# Patient Record
Sex: FEMALE | Race: WHITE | NOT HISPANIC OR LATINO | URBAN - METROPOLITAN AREA
[De-identification: names, ages, dates, MRNs, and addresses within clinical notes are randomized per-mention and may not be internally consistent; named-entity substitution may affect disease eponyms.]

---

## 2017-11-10 ENCOUNTER — OFFICE VISIT - HEALTHEAST (OUTPATIENT)
Dept: FAMILY MEDICINE | Facility: CLINIC | Age: 52
End: 2017-11-10

## 2017-11-10 DIAGNOSIS — E66.9 OBESITY: ICD-10-CM

## 2017-11-10 DIAGNOSIS — Z00.00 HEALTHCARE MAINTENANCE: ICD-10-CM

## 2017-11-10 DIAGNOSIS — Z12.11 SCREEN FOR COLON CANCER: ICD-10-CM

## 2017-11-10 DIAGNOSIS — J45.20 ASTHMA, MILD INTERMITTENT, WELL-CONTROLLED: ICD-10-CM

## 2017-11-10 DIAGNOSIS — Z12.31 VISIT FOR SCREENING MAMMOGRAM: ICD-10-CM

## 2017-11-10 DIAGNOSIS — I10 ESSENTIAL HYPERTENSION, MALIGNANT: ICD-10-CM

## 2017-11-10 DIAGNOSIS — I10 HTN (HYPERTENSION): ICD-10-CM

## 2017-11-10 DIAGNOSIS — I10 ESSENTIAL HYPERTENSION: ICD-10-CM

## 2017-11-10 DIAGNOSIS — Z23 NEED FOR VACCINATION: ICD-10-CM

## 2017-11-10 LAB
CHOLEST SERPL-MCNC: 207 MG/DL
FASTING STATUS PATIENT QL REPORTED: YES
HDLC SERPL-MCNC: 40 MG/DL
LDLC SERPL CALC-MCNC: 137 MG/DL
TRIGL SERPL-MCNC: 151 MG/DL

## 2017-11-10 RX ORDER — ALBUTEROL SULFATE 90 UG/1
2 AEROSOL, METERED RESPIRATORY (INHALATION) EVERY 4 HOURS PRN
Qty: 1 INHALER | Refills: 1 | Status: SHIPPED | OUTPATIENT
Start: 2017-11-10

## 2017-11-10 RX ORDER — CHLORTHALIDONE 25 MG/1
TABLET ORAL
Qty: 90 TABLET | Refills: 3 | Status: SHIPPED | OUTPATIENT
Start: 2017-11-10

## 2017-11-10 ASSESSMENT — MIFFLIN-ST. JEOR: SCORE: 1916.31

## 2017-11-27 ENCOUNTER — AMBULATORY - HEALTHEAST (OUTPATIENT)
Dept: FAMILY MEDICINE | Facility: CLINIC | Age: 52
End: 2017-11-27

## 2017-11-27 ENCOUNTER — AMBULATORY - HEALTHEAST (OUTPATIENT)
Dept: LAB | Facility: CLINIC | Age: 52
End: 2017-11-27

## 2017-11-27 ENCOUNTER — AMBULATORY - HEALTHEAST (OUTPATIENT)
Dept: NURSING | Facility: CLINIC | Age: 52
End: 2017-11-27

## 2017-11-27 DIAGNOSIS — I10 HTN (HYPERTENSION): ICD-10-CM

## 2017-11-28 ENCOUNTER — COMMUNICATION - HEALTHEAST (OUTPATIENT)
Dept: FAMILY MEDICINE | Facility: CLINIC | Age: 52
End: 2017-11-28

## 2017-12-06 ENCOUNTER — HOSPITAL ENCOUNTER (OUTPATIENT)
Dept: MAMMOGRAPHY | Facility: HOSPITAL | Age: 52
Discharge: HOME | End: 2017-12-06
Attending: FAMILY MEDICINE

## 2017-12-06 DIAGNOSIS — Z12.31 VISIT FOR SCREENING MAMMOGRAM: ICD-10-CM

## 2017-12-13 ENCOUNTER — AMBULATORY - HEALTHEAST (OUTPATIENT)
Dept: FAMILY MEDICINE | Facility: CLINIC | Age: 52
End: 2017-12-13

## 2017-12-13 ENCOUNTER — AMBULATORY - HEALTHEAST (OUTPATIENT)
Dept: LAB | Facility: CLINIC | Age: 52
End: 2017-12-13

## 2017-12-13 ENCOUNTER — AMBULATORY - HEALTHEAST (OUTPATIENT)
Dept: NURSING | Facility: CLINIC | Age: 52
End: 2017-12-13

## 2017-12-13 DIAGNOSIS — I10 HTN (HYPERTENSION): ICD-10-CM

## 2017-12-13 DIAGNOSIS — I10 ESSENTIAL HYPERTENSION: ICD-10-CM

## 2018-01-04 ENCOUNTER — AMBULATORY - HEALTHEAST (OUTPATIENT)
Dept: LAB | Facility: CLINIC | Age: 53
End: 2018-01-04

## 2018-01-04 ENCOUNTER — OFFICE VISIT - HEALTHEAST (OUTPATIENT)
Dept: FAMILY MEDICINE | Facility: CLINIC | Age: 53
End: 2018-01-04

## 2018-01-04 DIAGNOSIS — Z13.21 SCREENING FOR ENDOCRINE, NUTRITIONAL, METABOLIC AND IMMUNITY DISORDER: ICD-10-CM

## 2018-01-04 DIAGNOSIS — Z71.9 HEALTH EDUCATION: ICD-10-CM

## 2018-01-04 DIAGNOSIS — Z13.29 SCREENING FOR ENDOCRINE, NUTRITIONAL, METABOLIC AND IMMUNITY DISORDER: ICD-10-CM

## 2018-01-04 DIAGNOSIS — Z13.0 SCREENING FOR ENDOCRINE, NUTRITIONAL, METABOLIC AND IMMUNITY DISORDER: ICD-10-CM

## 2018-01-04 DIAGNOSIS — R63.5 WEIGHT GAIN: ICD-10-CM

## 2018-01-04 DIAGNOSIS — E55.9 AVITAMINOSIS D: ICD-10-CM

## 2018-01-04 DIAGNOSIS — Z13.0 SCREENING FOR DEFICIENCY ANEMIA: ICD-10-CM

## 2018-01-04 DIAGNOSIS — L83 ACANTHOSIS NIGRICANS: ICD-10-CM

## 2018-01-04 DIAGNOSIS — Z13.228 SCREENING FOR ENDOCRINE, NUTRITIONAL, METABOLIC AND IMMUNITY DISORDER: ICD-10-CM

## 2018-01-04 DIAGNOSIS — E88.810 METABOLIC SYNDROME: ICD-10-CM

## 2018-01-04 DIAGNOSIS — R73.03 PREDIABETES: ICD-10-CM

## 2018-01-04 DIAGNOSIS — E28.2 PCOS (POLYCYSTIC OVARIAN SYNDROME): ICD-10-CM

## 2018-01-04 LAB
ERYTHROCYTE [DISTWIDTH] IN BLOOD BY AUTOMATED COUNT: 12 % (ref 11–14.5)
HBA1C MFR BLD: 6 % (ref 3.5–6)
HCT VFR BLD AUTO: 39.2 % (ref 35–47)
HGB BLD-MCNC: 13.1 G/DL (ref 12–16)
MCH RBC QN AUTO: 29 PG (ref 27–34)
MCHC RBC AUTO-ENTMCNC: 33.4 G/DL (ref 32–36)
MCV RBC AUTO: 87 FL (ref 80–100)
PLATELET # BLD AUTO: 460 THOU/UL (ref 140–440)
PMV BLD AUTO: 6.9 FL (ref 7–10)
RBC # BLD AUTO: 4.51 MILL/UL (ref 3.8–5.4)
TSH SERPL DL<=0.005 MIU/L-ACNC: 3.19 UIU/ML (ref 0.3–5)
WBC: 11 THOU/UL (ref 4–11)

## 2018-01-04 ASSESSMENT — MIFFLIN-ST. JEOR: SCORE: 1920.85

## 2018-01-04 NOTE — ASSESSMENT & PLAN NOTE
52 y.o. female in clinic today to discuss treatment of the following conditions through radical diet change, lifestyle modification and weight loss:  1. Class 3 obesity with serious comorbidity and body mass index (BMI) of 45.0 to 49.9 in adult, unspecified obesity type    2. Metabolic syndrome    3. Acanthosis nigricans    4. PCOS (polycystic ovarian syndrome)    5. Prediabetes    6. Screening for endocrine, nutritional, metabolic and immunity disorder    7. Screening for deficiency anemia      This patient meets 5/5 criteria for metabolic syndrome.  She is hypertension.  She has laboratory evidence today of prediabetes.  She eats sporadically in foods that are of low quality in low quantity.  Given that she is not struggling with excessive hunger, I am recommending that she increase her nutrition and specifically focus on eating breakfast every day.  For now, any type of breakfast (hard boiled egg?)  Is the recommendation.  We did talk about weekend meal planning given the intensity of her work schedule.  She will increase physical activity as able.  -Discuss whether or not the patient is snoring at our next visit.  -Start metformin today.  Thousand milligrams daily.  -We discussed expectations for her program.  I explained that we are not a project although we will help her prepare for her daughter's wedding.  We will work towards modest weight loss.  -She is a candidate for [place] surgery we did discuss that this might be indicated.  Currently, her insurance is an exclusion but she was urged to check to see if this is changed in 2018.  If her job is changing as she is working on, she may want to consider this with different/improved insurance.    The patient defines success for this program as follows:qualitative: Avoid diabetes.  Be more comfortable in settings such as the gym and in the pool.  Prepare for daughter's wedding in 4 months.   - quantitative: 175 lbs (last at this weight at age of  28)    Medications prescribed today:   - Metformin was prescribed.        Return to clinic in 4 weeks.

## 2018-01-05 LAB
25(OH)D3 SERPL-MCNC: 25.1 NG/ML (ref 30–80)
25(OH)D3 SERPL-MCNC: 25.1 NG/ML (ref 30–80)

## 2018-01-30 ENCOUNTER — COMMUNICATION - HEALTHEAST (OUTPATIENT)
Dept: FAMILY MEDICINE | Facility: CLINIC | Age: 53
End: 2018-01-30

## 2018-02-09 ENCOUNTER — OFFICE VISIT - HEALTHEAST (OUTPATIENT)
Dept: FAMILY MEDICINE | Facility: CLINIC | Age: 53
End: 2018-02-09

## 2018-02-09 DIAGNOSIS — E88.810 METABOLIC SYNDROME: ICD-10-CM

## 2018-02-09 DIAGNOSIS — I10 ESSENTIAL HYPERTENSION: ICD-10-CM

## 2018-02-09 DIAGNOSIS — R73.03 PREDIABETES: ICD-10-CM

## 2018-02-09 NOTE — ASSESSMENT & PLAN NOTE
52 y.o. year old female in clinic today to discuss treatment of the following conditions through diet and lifestyle modification and weight loss:  1. Prediabetes    2. Metabolic syndrome    3. Essential hypertension    4. Class 3 obesity with serious comorbidity and body mass index (BMI) of 45.0 to 49.9 in adult, unspecified obesity type      The patient's efforts this past month were successful as evidenced by weight loss and adherence to dietary recommendations.  I am somewhat concernedthat she is not consuming adequate calories and nutrition.  We discussed bulking up her breakfast.  I did congratulate her on eating breakfast she had not been in the practice previously.  I also encouraged her to meet with the dietitian to work on strategies to add variety to her meal plans.  She will work on adding more calories through healthy fats and a larger breakfast.  As the weather allows and her schedule allows she will beginto add exercise.  Return to clinic in 1 month.  -I suspect that during the next month she will require titration of her into hypertensive medications.  Initially chlorthalidone was started to help address lower extremity swelling.  Losartan was added for blood pressure management.  I would  consider decreasing her losartan before adjusting her chlorthalidone.

## 2018-02-23 ENCOUNTER — SURGERY - HEALTHEAST (OUTPATIENT)
Dept: GASTROENTEROLOGY | Facility: HOSPITAL | Age: 53
End: 2018-02-23

## 2018-02-23 ASSESSMENT — MIFFLIN-ST. JEOR: SCORE: 1825.59

## 2018-03-12 ENCOUNTER — OFFICE VISIT - HEALTHEAST (OUTPATIENT)
Dept: FAMILY MEDICINE | Facility: CLINIC | Age: 53
End: 2018-03-12

## 2018-03-12 DIAGNOSIS — E28.2 PCOS (POLYCYSTIC OVARIAN SYNDROME): ICD-10-CM

## 2018-03-12 DIAGNOSIS — I10 ESSENTIAL HYPERTENSION: ICD-10-CM

## 2018-03-12 DIAGNOSIS — R73.03 PREDIABETES: ICD-10-CM

## 2018-03-12 DIAGNOSIS — E88.810 METABOLIC SYNDROME: ICD-10-CM

## 2018-03-12 NOTE — ASSESSMENT & PLAN NOTE
"52 y.o. year old female in clinic today to discuss treatment of the following conditions through diet and lifestyle modification and weight loss:  1. Class 3 obesity with serious comorbidity and body mass index (BMI) of 45.0 to 49.9 in adult, unspecified obesity type    2. Essential hypertension    3. Metabolic syndrome    4. PCOS (polycystic ovarian syndrome)    5. Prediabetes      The patient's efforts this past month were unsuccessful giving return to previous diet plan.   - resume planning, prepping and eating breakfast.   - \"make it a habit.\"   - RTC in one month.  Place framework for time after she has moved to [place].    "

## 2018-04-15 ENCOUNTER — COMMUNICATION - HEALTHEAST (OUTPATIENT)
Dept: FAMILY MEDICINE | Facility: CLINIC | Age: 53
End: 2018-04-15

## 2018-04-25 ENCOUNTER — OFFICE VISIT - HEALTHEAST (OUTPATIENT)
Dept: FAMILY MEDICINE | Facility: CLINIC | Age: 53
End: 2018-04-25

## 2018-04-25 DIAGNOSIS — I10 ESSENTIAL HYPERTENSION: ICD-10-CM

## 2018-04-25 DIAGNOSIS — L83 ACANTHOSIS NIGRICANS: ICD-10-CM

## 2018-04-25 DIAGNOSIS — E88.810 METABOLIC SYNDROME: ICD-10-CM

## 2018-04-25 DIAGNOSIS — E28.2 PCOS (POLYCYSTIC OVARIAN SYNDROME): ICD-10-CM

## 2018-04-25 RX ORDER — METFORMIN HCL 500 MG
1000 TABLET, EXTENDED RELEASE 24 HR ORAL
Qty: 180 TABLET | Refills: 1 | Status: SHIPPED | OUTPATIENT
Start: 2018-04-25

## 2018-04-25 NOTE — ASSESSMENT & PLAN NOTE
52 y.o. year old female in clinic today to discuss treatment of the following conditions through diet and lifestyle modification and weight loss:  1. Essential hypertension    2. Class 3 obesity with serious comorbidity and body mass index (BMI) of 45.0 to 49.9 in adult, unspecified obesity type    3. Metabolic syndrome    4. Acanthosis nigricans    5. PCOS (polycystic ovarian syndrome)      The patient's efforts this past month were successful as evidenced by weight loss.   - continue to plan and prep.   - continue metformin   - establish care in [place].

## 2018-05-03 ENCOUNTER — COMMUNICATION - HEALTHEAST (OUTPATIENT)
Dept: FAMILY MEDICINE | Facility: CLINIC | Age: 53
End: 2018-05-03

## 2018-07-02 ENCOUNTER — COMMUNICATION - HEALTHEAST (OUTPATIENT)
Dept: FAMILY MEDICINE | Facility: CLINIC | Age: 53
End: 2018-07-02

## 2018-07-02 DIAGNOSIS — L83 ACANTHOSIS NIGRICANS: ICD-10-CM

## 2018-07-02 DIAGNOSIS — E88.810 METABOLIC SYNDROME: ICD-10-CM

## 2018-07-02 DIAGNOSIS — E28.2 PCOS (POLYCYSTIC OVARIAN SYNDROME): ICD-10-CM

## 2018-07-03 RX ORDER — METFORMIN HCL 500 MG
TABLET, EXTENDED RELEASE 24 HR ORAL
Qty: 180 TABLET | Refills: 1 | Status: SHIPPED | OUTPATIENT
Start: 2018-07-03

## 2018-11-20 ENCOUNTER — COMMUNICATION - HEALTHEAST (OUTPATIENT)
Dept: FAMILY MEDICINE | Facility: CLINIC | Age: 53
End: 2018-11-20

## 2018-11-20 DIAGNOSIS — I10 ESSENTIAL HYPERTENSION, MALIGNANT: ICD-10-CM

## 2018-12-05 ENCOUNTER — COMMUNICATION - HEALTHEAST (OUTPATIENT)
Dept: FAMILY MEDICINE | Facility: CLINIC | Age: 53
End: 2018-12-05

## 2018-12-05 DIAGNOSIS — I10 ESSENTIAL HYPERTENSION: ICD-10-CM

## 2018-12-12 ENCOUNTER — COMMUNICATION - HEALTHEAST (OUTPATIENT)
Dept: FAMILY MEDICINE | Facility: CLINIC | Age: 53
End: 2018-12-12

## 2019-03-18 ENCOUNTER — COMMUNICATION - HEALTHEAST (OUTPATIENT)
Dept: FAMILY MEDICINE | Facility: CLINIC | Age: 54
End: 2019-03-18

## 2019-03-18 DIAGNOSIS — I10 ESSENTIAL HYPERTENSION: ICD-10-CM

## 2019-03-19 RX ORDER — LOSARTAN POTASSIUM 25 MG/1
25 TABLET ORAL DAILY
Qty: 30 TABLET | Refills: 1 | Status: SHIPPED | OUTPATIENT
Start: 2019-03-19

## 2021-05-31 VITALS — BODY MASS INDEX: 48.48 KG/M2 | WEIGHT: 291 LBS | HEIGHT: 65 IN

## 2021-05-31 VITALS — WEIGHT: 290 LBS | HEIGHT: 65 IN | BODY MASS INDEX: 48.32 KG/M2

## 2021-06-01 VITALS — WEIGHT: 273 LBS | BODY MASS INDEX: 45.43 KG/M2

## 2021-06-01 VITALS — BODY MASS INDEX: 44.43 KG/M2 | WEIGHT: 267 LBS

## 2021-06-01 VITALS — HEIGHT: 65 IN | BODY MASS INDEX: 44.98 KG/M2 | WEIGHT: 270 LBS

## 2021-06-01 VITALS — BODY MASS INDEX: 45.45 KG/M2 | WEIGHT: 273.1 LBS

## 2021-06-14 NOTE — PROGRESS NOTES
Refill sent.  BP looks adequately controlled. Continue current dose. If cough was from lisinopril, would expect it to completely clear up soon. If not gone in 2 weeks, schedule OV to look for other causes.

## 2021-06-14 NOTE — PROGRESS NOTES
I met with Natasha Mcdonald at the request of DR Marina to recheck her blood pressure.  Blood pressure medications on the MAR were reviewed with patient.    Patient has taken all medications as per usual regimen: Yes  Patient reports tolerating them without any issues or concerns: No and Pt stated severly coughing since starting the Lisinopril. She would like an alternative RX if this is something that wont get better.     Vitals:    11/27/17 1334   BP: 124/80   Patient Site: Right Arm   Patient Position: Sitting   Cuff Size: Adult Large   Pulse: 72       Blood pressure was taken, previous encounter was reviewed, recorded blood pressure below 140/90.  Patient was discharged and the note will be sent to the provider for final review.

## 2021-06-14 NOTE — PROGRESS NOTES
I met with Natasha Mcdonald at the request of  Dr Marina to recheck her blood pressure.  Blood pressure medications on the MAR were reviewed with patient.    Patient has taken all medications as per usual regimen: Yes; Switched from Lisinopril to Losartan   Patient reports tolerating them without any issues or concerns: Yes;  Still has slight cough from Lisinopril.   She will need refill sent to pharmacy - RX pended         Vitals:    12/13/17 0908   BP: 128/80   Patient Site: Right Arm   Patient Position: Sitting   Cuff Size: Adult Large   Pulse: 76       Blood pressure was taken, previous encounter was reviewed, recorded blood pressure below 140/90.  Patient was discharged and the note will be sent to the provider for final review.

## 2021-06-14 NOTE — PROGRESS NOTES
New rx sent for losartan 25mg daily instead of lisinopril. Recheck nurse BP and nonfasting labs in 2 weeks.

## 2021-06-14 NOTE — PROGRESS NOTES
"ASSESSMENT & PLAN:    1. Essential hypertension  Significantly elevated blood pressure today, denies any symptoms.  She is taking 3 year old chlorthalidone, and also states her blood pressure typically runs higher in clinic due to whitecoat hypertension.  She has not been checking at home recently.  A new prescription for chlorthalidone was sent, as well as a new prescription for lisinopril 10 mg daily, which she can start tonight or tomorrow as long as labs return with normal potassium and creatinine.  Recommend she return in 2 weeks for nurse blood pressure check and nonfasting labs for BMP after starting lisinopril.  Suspect she will likely need an increase in her lisinopril, but she should monitor at home over the next couple weeks and return for nurse blood pressure check in 2 weeks.  - chlorthalidone (HYGROTEN) 25 MG tablet; TAKE 1 TABLET (25 MG TOTAL) BY MOUTH DAILY.  Dispense: 90 tablet; Refill: 3  - CMP    2.  Annual physical  Hysterectomy for adenomyosis, no longer needs Paps.  Absence of cervix confirmed on speculum exam today.  Mammogram and colonoscopy advised.  She could also look into cologuard insurance coverage.  Fasting lipids and glucose today.  Tdap and flu shot today.  - Mammo Screening Bilateral; Future  - Ambulatory referral for Colonoscopy  - Cologuard  - Tdap vaccine,  6yo or older,  IM  - Influenza, Seasonal,Quad Inj, 36+ MOS    3.  Obesity  States she attended a class for bariatric surgery, wanted to go ahead with it, but states her insurance will not cover it.  Discussed [place] [facility], and she is interested.  Referral placed.  - Comprehensive Metabolic Panel    4. Asthma, mild intermittent, well-controlled  Rare albuterol use, only with \"bronchitis\" on average once per winter.  Refill provided to have on hand when needed.  - albuterol (PROAIR HFA) 90 mcg/actuation inhaler; Inhale 2 puffs every 4 (four) hours as needed for wheezing.  Dispense: 1 Inhaler; Refill: " 1      Patient Instructions   1. Check to see if Cologuard is covered by your insurance.   2. Either schedule a colonoscopy or complete Cologuard screening.   3. Schedule a nurse blood pressure check and fasting labs in 2 weeks.   4.  and start new chlorthalidone prescription.   5. If your labs come back normal tonight (potassium and creatinine) you can start the lisinopril tomorrow.   6. Schedule with [facility] - [email or web address]        Orders Placed This Encounter   Procedures     Mammo Screening Bilateral     Standing Status:   Future     Standing Expiration Date:   2/10/2019     Order Specific Question:   Reason for Exam (Describe Symptoms):     Answer:   Screening     Order Specific Question:   Is the patient pregnant?     Answer:   No     Order Specific Question:   Can the procedure be changed per Radiologist protocol?     Answer:   Yes     Tdap vaccine,  8yo or older,  IM     Influenza, Seasonal,Quad Inj, 36+ MOS     Comprehensive Metabolic Panel     Lipid Cascade     Order Specific Question:   Fasting is required?     Answer:   Yes     Ambulatory referral for Colonoscopy     Referral Priority:   Routine     Referral Type:   Colonoscopy     Referral Reason:   Evaluation and Treatment     Requested Specialty:   Gastroenterology     Number of Visits Requested:   1     Ambulatory referral for Weight Management: [place]     Referral Priority:   Routine     Referral Type:   Health Education     Referral Reason:   Evaluation and Treatment     Number of Visits Requested:   1     Cologuard     Medications Discontinued During This Encounter   Medication Reason     oxyCODONE-acetaminophen (PERCOCET) 5-325 mg per tablet Therapy completed     chlorthalidone (HYGROTEN) 25 MG tablet Reorder     albuterol (PROAIR HFA) 90 mcg/actuation inhaler Reorder       No Follow-up on file.    CHIEF COMPLAINT:  Chief Complaint   Patient presents  with     Annual Exam     fasting labs; no concerns       HISTORY OF PRESENT ILLNESS:  Natasha is a 52 y.o. female presenting to the clinic today for a physical examination.    Hypertension: She recently restarted taking 25 mg of chlorthalidone for fluid retention in her legs. She had stopped taking it because she misplaced the prescription while moving homes. She notes that she has taken hydrochlorothiazide in the past, previous to chlorthalidone. Her blood pressure today is 170/102. She says that she does get nervous at the clinic. She states that she can feel her blood pressure is elevated because she has been experiencing a lot of tension and stress at work for the last six moths. She does have a blood pressure cuff at home but she has not been using it. She denies experiencing any shortness of breath.     Health Maintenance: She is due for a colonoscopy and will either do a colonoscopy or Cologuard screening. She is due for a mammogram.      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 About one year ago she was scheduled for bariatric surgery but her insurance refused to cover it, labeling it as a cosmetic surgery. She had already [facility]. She was very sad she could not get the surgery and is wondering about other options. She plans to schedule an appointment with [facility] in [place].      REVIEW OF SYSTEMS:   She typically experiences bronchitis once per year and treats this with albuterol. She does have a skin tag on her genital area but it does not bother her. She denies experiencing any symptoms of menopause. All other systems are negative.    PFSH:  Surgical: She had a hysterectomy when she was 43 years old due to adenomyosis. Her cervix was removed with the hysterectomy so no longer needs pap smears. She denies any cancer or precancer of the cervix. 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 She was following up at [facility] for 6 years but is doing well now. She did not need chemotherapy. They instructed her to follow up if any new lumps/growths.  Family: She denies any family history of colon or breast cancer. She denies any family history of heart attacks, heart defects, or strokes. Her mother has moderate hypertension that is well controlled on medications.     Medical: She has not had a colonoscopy before. She denies any family history of diabetes. She denies experiencing gestational diabetes during her one pregnancy.       Social History     Social History     Marital status: Single     Spouse name: N/A     Number of children: N/A     Years of education: N/A     Occupational History     Not on file.     Social History Main Topics     Smoking status: Never Smoker     Smokeless tobacco: Not on file     Alcohol use Not on file     Drug use: Not on file     Sexual activity: Not on file     Other Topics Concern     Not on file     Social History Narrative       No past medical history on file.    Past Surgical History:   Procedure Laterality Date      SECTION       HYSTERECTOMY       OTHER SURGICAL HISTORY      liposarcoma removal, left arm     TONSILLECTOMY         No Known Allergies    Active Ambulatory Problems     Diagnosis Date Noted     Liposarcoma of upper extremity 2015     HTN (hypertension) 2015     Mild intermittent asthma 2015     Resolved Ambulatory Problems     Diagnosis Date Noted     No Resolved Ambulatory Problems     No Additional Past Medical History       Family History   Problem Relation Age of Onset     Lymphoma Father      Hypertension         VITALS:  Vitals:    11/10/17 1049   BP: (!) 170/102   Patient Site: Right Arm   Patient Position: Sitting   Cuff Size: Adult Large   Pulse: 72   Resp: 16   Temp: 98.3  F (36.8  C)   TempSrc: Oral   Weight: (!) 290 lb (131.5 kg)   Height: 5' 5\" (1.651 m)     Wt Readings from Last 3 " Encounters:   11/10/17 (!) 290 lb (131.5 kg)   03/17/15 (!) 278 lb (126.1 kg)       QUALITY MEASURES:  The following high BMI interventions were performed this visit: encouragement to exercise    PHYSICAL EXAM:  GENERAL:  Pleasant, well-appearing woman in no acute distress.  VITAL SIGNS:  Reviewed.  HEENT:  Pupils are equal, round, and reactive to light.  Sclerae and  conjunctivae clear.  TMs are clear bilaterally.  Oropharynx is clear with  moist mucous membranes.  NECK:  Supple without lymphadenopathy or thyromegaly.  No carotid bruits.  CARDIOVASCULAR:  Heart regular rate and rhythm without murmur.  Normal S1 and  S2.  LUNGS:  Clear to auscultation bilaterally without wheezes or crackles.  Good  air movement throughout.  BREASTS:  Normal contours.  No skin dimpling, nipple retraction or nipple  discharge.  Palpation reveals no masses, no supraclavicular or axillary  lymphadenopathy.    ABDOMEN:  Soft, nontender, and nondistended without  guarding or rebound.  No organomegaly.  PELVIS:  Normal female external genitalia.  No skin lesions.  Speculum exam reveals absent cervix and normal vaginal mucosa. Bimanual exam extremely limited due to body habitus.  EXTREMITIES:  Warm and well perfused without edema. Dorsalis pedis pulses easily palable and symmetric bilaterally.  NEURO: Alert and oriented. Grossly nonfocal.  PSYCHIATRIC: Presents on time and well groomed.  Normal speach and thought content.  Full affect.  No abnormal movements or behaviors noted.     DATA REVIEWED:  ADDITIONAL HISTORY SUMMARIZED (2): None.   DECISION TO OBTAIN EXTRA INFORMATION (1): None.   RADIOLOGY TESTS SUMMARIZED OR ORDERED (1): None.  LABS REVIEWED OR ORDERED (1): Labs ordered.   MEDICINE TESTS SUMMARIZED OR ORDERED (1): None.  INDEPENDENT REVIEW OF EKG OR X-RAY (2 EACH): None.     The visit lasted a total of 22 minutes face to face with the patient. Over 50% of the time was spent counseling and educating the patient about weight,  hypertension, health history, and health maintenance.    I, Alexandra Severson, am scribing for and in the presence of, Dr. Marina.    I, Dr. Marina, personally performed the services described in this documentation, as scribed by Alexandra Severson in my presence, and it is both accurate and complete.      MEDICATIONS:  Current Outpatient Prescriptions   Medication Sig Dispense Refill     albuterol (PROAIR HFA) 90 mcg/actuation inhaler Inhale 2 puffs every 4 (four) hours as needed for wheezing. 1 Inhaler 1     ascorbic acid (ASCORBIC ACID WITH ZAHIRA HIPS) 500 MG tablet Take 500 mg by mouth daily.       chlorthalidone (HYGROTEN) 25 MG tablet TAKE 1 TABLET (25 MG TOTAL) BY MOUTH DAILY. 90 tablet 3     multivitamin capsule Take 1 capsule by mouth daily.       lisinopril (PRINIVIL,ZESTRIL) 10 MG tablet Take 1 tablet (10 mg total) by mouth daily. 30 tablet 0     No current facility-administered medications for this visit.          Total Data Points: 1

## 2021-06-15 NOTE — PROGRESS NOTES
Assessment/Plan:    Class 3 obesity with serious comorbidity and body mass index (BMI) of 45.0 to 49.9 in adult, unspecified obesity type  52 y.o. female in clinic today to discuss treatment of the following conditions through radical diet change, lifestyle modification and weight loss:  1. Class 3 obesity with serious comorbidity and body mass index (BMI) of 45.0 to 49.9 in adult, unspecified obesity type    2. Metabolic syndrome    3. Acanthosis nigricans    4. PCOS (polycystic ovarian syndrome)    5. Prediabetes    6. Screening for endocrine, nutritional, metabolic and immunity disorder    7. Screening for deficiency anemia      This patient meets 5/5 criteria for metabolic syndrome.  She is hypertension.  She has laboratory evidence today of prediabetes.  She eats sporadically in foods that are of low quality in low quantity.  Given that she is not struggling with excessive hunger, I am recommending that she increase her nutrition and specifically focus on eating breakfast every day.  For now, any type of breakfast (hard boiled egg?)  Is the recommendation.  We did talk about weekend meal planning given the intensity of her work schedule.  She will increase physical activity as able.  -Discuss whether or not the patient is snoring at our next visit.  -Start metformin today.  Thousand milligrams daily.  -We discussed expectations for her program.  I explained that we are not a project although we will help her prepare for her daughter's wedding.  We will work towards modest weight loss.  -She is a candidate for Ferny surgery we did discuss that this might be indicated.  Currently, her insurance is an exclusion but she was urged to check to see if this is changed in 2018.  If her job is changing as she is working on, she may want to consider this with different/improved insurance.    The patient defines success for this program as follows:   - qualitative: Avoid diabetes.  Be more comfortable in settings such as  the gym and in the pool.  Prepare for daughter's wedding in 4 months.   - quantitative: 175 lbs (last at this weight at age of 28)    Medications prescribed today:   - Metformin was prescribed.        Return to clinic in 4 weeks.             The patient attended group visit to learn about obesity as a disease, an approach to treatment and metabolic factors. Nutrition counseling reviewed., Discussed all weight loss options with the patient including bariatric surgery. The patient expressed understanding and wishes to pursue medical weight loss at this time., Labs ordered and will review and discuss with the patient., Body composition analyzer done and results reviewed with the patient. Please see scanned results in chart. and Recommend 2000 mg of fish oil daily, a multivitamin daily, chromium as directed, and vitamin D supplementation as directed.    Dietary Intervention: Reduced calorie, reduced carbohydrates, whole food diet., Recommend increasing movement throughout the day--sitting less, moving more. Will increase activity over time to reach a goal of at least 150 minutes of moderate exercise each week., Behavior modification: Cognitive restructuring exercises, journaling stressors, triggers for food cravings., Recommend journaling and tracking food intake using either an online program such as [employer] or loseit.com or tracking using a paper and pencil. Advised paying particular attention to total carbohydrates, fiber, protein, calories, and fats, and added sugars. and Greater than 50% of this 30 minute visit was spent in counseling regarding obesity is a disease as well as the nutritional and exercise recommendations of our program as it pertains to the patient's own individual healthcare needs.           This note has been dictated using voice recognition software. Any grammatical or context distortions are unintentional and inherent to the  "software    ______________________________    SUBJECTIVE:    52 y.o. year old female with past medical history including hypertension, laboratory and diagnosis evidence of metabolic syndrome presenting to clinic today to discuss treatment of these conditions through radical diet change, lifestyle modification, and weight loss (intensive therapeutic lifestyle interventions including nutrition, physical activity, and behavior management).    This patient's been overweight since her 20s.  She currently works 60-70 hours per week and values convenience and her food choices.  She regularly skips meals and very infrequently eats breakfast.  She stopped eating breakfast when she was a teenager.  She has a strong family history of heart disease, hypertension, hypercholesterolemia.  She does not feel hunger very often.  She does not eat sweets.  She gave up soda a year ago in an effort to lose weight.  She is motivated today, specifically to lose \"50 pounds by my daughter's wedding\" which is in the month of April of this year.  She does not exercise but works in a physically active job in childcare.    Previous efforts at weight loss: Dieting, weight watchers, Nutrisystem.   -  The patient does not have , a history of using previous weight loss medications.    The patient has been overweight for 30+ years.  Contributing factors includes: Sedentary lifestyle, meal skipping, inadequate nutrition, excessive carbohydrates..  The patient does not exercise.  The patient does not have problems (physical, logistical) with exercise.  The patient does not eat nutritiously.  The patient does not overeat.  The patient does not snore.  The patient does not have a history of eating disorder.  The patient does not have a history of substance abuse (alcohol, drugs).    Social effects of obesity: low self esteem or body image dissatisfaction    Definition of success:   - qualitative: Avoid long-term consequences of weight such as " hypertension and diabetes.  Look good for wetting.  Be comfortable in a health club (swelling).   - goal weight: 175 (last time the patient was at this weight: 24 years ago)    Woman's health: Status post hysterectomy, PCO S.   - Number of children:  1    Patient Active Problem List   Diagnosis     Liposarcoma of upper extremity     HTN (hypertension)     Mild intermittent asthma       History reviewed. No pertinent past medical history.    Past Surgical History:   Procedure Laterality Date      SECTION       HYSTERECTOMY       OTHER SURGICAL HISTORY      liposarcoma removal, left arm     TONSILLECTOMY         Current Outpatient Prescriptions on File Prior to Visit   Medication Sig Dispense Refill     albuterol (PROAIR HFA) 90 mcg/actuation inhaler Inhale 2 puffs every 4 (four) hours as needed for wheezing. 1 Inhaler 1     ascorbic acid (ASCORBIC ACID WITH ZAHIRA HIPS) 500 MG tablet Take 500 mg by mouth daily.       chlorthalidone (HYGROTEN) 25 MG tablet TAKE 1 TABLET (25 MG TOTAL) BY MOUTH DAILY. 90 tablet 3     losartan (COZAAR) 25 MG tablet Take 1 tablet (25 mg total) by mouth daily. 90 tablet 1     multivitamin capsule Take 1 capsule by mouth daily.       No current facility-administered medications on file prior to visit.        Allergies   Allergen Reactions     Banana      Shrimp          Family History   Problem Relation Age of Onset     Lymphoma Father      Heart disease Father      Hypertension Father      Hyperlipidemia Father      Hypertension       Obesity Mother      Hypertension Mother      Obesity Sister      Obesity Brother      Heart disease Brother      Hypertension Brother        Social History     Social History     Marital status: Single     Spouse name: N/A     Number of children: N/A     Years of education: N/A     Social History Main Topics     Smoking status: Never Smoker     Smokeless tobacco: Never Used     Alcohol use None     Drug use: None     Sexual activity: Not  Asked     Other Topics Concern     None     Social History Narrative         ROS  A comprehensive review of systems was negative.  Pertinent items are noted in HPI.  Patient denies chest pain or pressure, shortness of breath, exertional intolerance, palpitations, or lightheadedness.    Vitals:    01/04/18 0812   BP: 134/82   Pulse: 86     Initial Weight: 291 lbs  Weight: 291 lb (132 kg)  Weight loss from initial: 0  % Weight loss: 0 %  Body Fat %: 55.2  Waist Circumference: 56 inches  Neck Circumference: 17.5 inches  Body mass index is 48.42 kg/(m^2).    EXAM    Gen: Alert, pleasant, cooperative, no distress, appears stated age, patient is obese.  Eyes: No conjunctival injection, no scleral icterus.  Neck: Supple, symmetrical, trachea midline.  No adenopathy.  Thyroid is without enlargement/tenderness/nodules.  Cardiac: Regular rate and rhythm, normal S1/S2, no murmurs or gallops, no edema at ankles bilaterally.  Respiratory: Clear to auscultation bilaterally.  Abdomen: Soft, nontender, no hepatosplenomegaly.  Extremities: Warm, well-perfused, no edema.  Skin: Skin, turgor, texture color is normal. Skin tags: Yes, striae: Yes, hirsutism: Yes, acanthosis nigricans: Yes.  Neurologic: Cranial nerves II through XII grossly intact.  2+ reflexes at the patella bilaterally.  Psych: Normal affect.  Normal rate of speech.  No tangentiality.      Body composition analyzer done and results reviewed with the patient.  Please see scanned results in chart.  Labs ordered and will review and discuss with the patient.    Recent Results (from the past 24 hour(s))   HM2(CBC w/o Differential)   Result Value Ref Range    WBC 11.0 4.0 - 11.0 thou/uL    RBC 4.51 3.80 - 5.40 mill/uL    Hemoglobin 13.1 12.0 - 16.0 g/dL    Hematocrit 39.2 35.0 - 47.0 %    MCV 87 80 - 100 fL    MCH 29.0 27.0 - 34.0 pg    MCHC 33.4 32.0 - 36.0 g/dL    RDW 12.0 11.0 - 14.5 %    Platelets 460 (H) 140 - 440 thou/uL    MPV 6.9 (L) 7.0 - 10.0 fL   Glycosylated  Hemoglobin A1c   Result Value Ref Range    Hemoglobin A1c 6.0 3.5 - 6.0 %

## 2021-06-15 NOTE — PROGRESS NOTES
Assessment and Plan:     Class 3 obesity with serious comorbidity and body mass index (BMI) of 45.0 to 49.9 in adult, unspecified obesity type  52 y.o. year old female in clinic today to discuss treatment of the following conditions through diet and lifestyle modification and weight loss:  1. Prediabetes    2. Metabolic syndrome    3. Essential hypertension    4. Class 3 obesity with serious comorbidity and body mass index (BMI) of 45.0 to 49.9 in adult, unspecified obesity type      The patient's efforts this past month were successful as evidenced by weight loss and adherence to dietary recommendations.  I am somewhat concerned that she is not consuming adequate calories and nutrition.  We discussed bulking up her breakfast.  I did congratulate her on eating breakfast she had not been in the practice previously.  I also encouraged her to meet with the dietitian to work on strategies to add variety to her meal plans.  She will work on adding more calories through healthy fats and a larger breakfast.  As the weather allows and her schedule allows she will begin to add exercise.  Return to clinic in 1 month.  -I suspect that during the next month she will require titration of her into hypertensive medications.  Initially chlorthalidone was started to help address lower extremity swelling.  Losartan was added for blood pressure management.  I would  consider decreasing her losartan before adjusting her chlorthalidone.    Follow up in 1 month.  Continue supplements.    Recommend increasing movement throughout the day--sitting less, moving more.  Will increase activity over time to reach a goal of at least 150 minutes of moderate exercise each week.  Behavior modification:  Cognitive restructuring exercises, journaling stressors, triggers for food cravings.  Dietary Intervention:  Reduced calorie, reduced carbohydrates, whole food diet.  Greater than 50% of this 15 minute visit was spent in counseling regarding  patient's obesity, medications, dietary, exercise, and behavior modification recommendations.      Subjective  Patient presents for treatment of chronic, comorbid conditions using intensive therapeutic lifestyle interventions including nutrition, physical activity, and behavior management.    Getting about 1100 calories.  Tracking in a meticulous way.  Using [employer].  She has reduced carbs.  No starches.  Does not miss starches.  Struggling to get enough veggies.  She has been hungry at appropriate times.  She is targeting lower fat proteins.  Some protein bars.      Are you experiencing any side effects to the medications:  No  Hunger control:  good  Exercise was discussed: yes  Taking supplements:  yes  Discussed journaling food:  yes  Patient is pleased with the current results:  yes  The patient is following the nutrition plan:  yes, inadequate calories?  Barriers to losing weight:  Behavior:  inadequate exercise    Patient Active Problem List   Diagnosis     Liposarcoma of upper extremity     HTN (hypertension)     Mild intermittent asthma     Class 3 obesity with serious comorbidity and body mass index (BMI) of 45.0 to 49.9 in adult, unspecified obesity type     Metabolic syndrome     PCOS (polycystic ovarian syndrome)     Prediabetes       Current Outpatient Prescriptions on File Prior to Visit   Medication Sig Dispense Refill     albuterol (PROAIR HFA) 90 mcg/actuation inhaler Inhale 2 puffs every 4 (four) hours as needed for wheezing. 1 Inhaler 1     ascorbic acid (ASCORBIC ACID WITH ZAHIRA HIPS) 500 MG tablet Take 500 mg by mouth daily.       chlorthalidone (HYGROTEN) 25 MG tablet TAKE 1 TABLET (25 MG TOTAL) BY MOUTH DAILY. 90 tablet 3     ergocalciferol (ERGOCALCIFEROL) 50,000 unit capsule Take 1 capsule (50,000 Units total) by mouth once a week for 8 doses. 8 capsule 0     losartan (COZAAR) 25 MG tablet Take 1 tablet (25 mg total) by mouth daily. 90 tablet 1     metFORMIN (GLUCOPHAGE XR) 500 MG 24 hr  tablet Take 2 tablets (1,000 mg total) by mouth daily with breakfast. 180 tablet 1     multivitamin capsule Take 1 capsule by mouth daily.       No current facility-administered medications on file prior to visit.        Objective:  Vitals:    02/09/18 0718   BP: 134/86   Pulse: 88     Initial Weight: 291 lbs  Weight: 273 lb 1.6 oz (123.9 kg)  Weight loss from initial: 17.9  % Weight loss: 6.15 %  Body mass index is 45.45 kg/(m^2).  General:  Patient is alert, pleasant, no distress.  Patient is obese.    This note has been dictated using voice recognition software. Any grammatical or context distortions are unintentional and inherent to the software

## 2021-06-16 PROBLEM — E88.810 METABOLIC SYNDROME: Status: ACTIVE | Noted: 2018-01-04

## 2021-06-16 PROBLEM — R73.03 PREDIABETES: Status: ACTIVE | Noted: 2018-01-04

## 2021-06-16 PROBLEM — E28.2 PCOS (POLYCYSTIC OVARIAN SYNDROME): Status: ACTIVE | Noted: 2018-01-04

## 2021-06-16 NOTE — PROGRESS NOTES
"Assessment and Plan:     Class 3 obesity with serious comorbidity and body mass index (BMI) of 45.0 to 49.9 in adult, unspecified obesity type  52 y.o. year old female in clinic today to discuss treatment of the following conditions through diet and lifestyle modification and weight loss:  1. Class 3 obesity with serious comorbidity and body mass index (BMI) of 45.0 to 49.9 in adult, unspecified obesity type    2. Essential hypertension    3. Metabolic syndrome    4. PCOS (polycystic ovarian syndrome)    5. Prediabetes      The patient's efforts this past month were unsuccessful giving return to previous diet plan.   - resume planning, prepping and eating breakfast.   - \"make it a habit.\"   - RTC in one month.  Place framework for time after she has moved to [place].      Follow up in 1 month.  Continue supplements.    Recommend increasing movement throughout the day--sitting less, moving more.  Will increase activity over time to reach a goal of at least 150 minutes of moderate exercise each week.  Behavior modification:  Cognitive restructuring exercises, journaling stressors, triggers for food cravings.  Dietary Intervention:  Reduced calorie, reduced carbohydrates, whole food diet.  Greater than 50% of this 15 minute visit was spent in counseling regarding patient's obesity, medications, dietary, exercise, and behavior modification recommendations.      Subjective  Patient presents for treatment of chronic, comorbid conditions using intensive therapeutic lifestyle interventions including nutrition, physical activity, and behavior management.    Moving later this spring.  Will be in [place]. Daughters wedding is coming up.  She regressed to her old ways.  Stopped eating breakfast.  Lots of parties.      Are you experiencing any side effects to the medications:  No  Hunger control:  good  Exercise was discussed: yes  Taking supplements:  yes  Discussed journaling food:  yes  Patient is pleased with the current " results:  no  The patient is following the nutrition plan:  no  Barriers to losing weight:  Behavior:  social calories    Patient Active Problem List   Diagnosis     Liposarcoma of upper extremity     HTN (hypertension)     Mild intermittent asthma     Class 3 obesity with serious comorbidity and body mass index (BMI) of 45.0 to 49.9 in adult, unspecified obesity type     Metabolic syndrome     PCOS (polycystic ovarian syndrome)     Prediabetes       Current Outpatient Prescriptions on File Prior to Visit   Medication Sig Dispense Refill     albuterol (PROAIR HFA) 90 mcg/actuation inhaler Inhale 2 puffs every 4 (four) hours as needed for wheezing. 1 Inhaler 1     ascorbic acid (ASCORBIC ACID WITH ZAHIRA HIPS) 500 MG tablet Take 500 mg by mouth daily.       chlorthalidone (HYGROTEN) 25 MG tablet TAKE 1 TABLET (25 MG TOTAL) BY MOUTH DAILY. 90 tablet 3     losartan (COZAAR) 25 MG tablet Take 1 tablet (25 mg total) by mouth daily. 90 tablet 1     metFORMIN (GLUCOPHAGE XR) 500 MG 24 hr tablet Take 2 tablets (1,000 mg total) by mouth daily with breakfast. 180 tablet 1     multivitamin capsule Take 1 capsule by mouth daily.       No current facility-administered medications on file prior to visit.        Objective:  Vitals:    03/12/18 0740   BP: 126/80   Pulse: 76     Initial Weight: 291 lbs  Weight: 273 lb (123.8 kg)  Weight loss from initial: 18  % Weight loss: 6.19 %  Body mass index is 45.43 kg/(m^2).  General:  Patient is alert, pleasant, no distress.  Patient is obese.    This note has been dictated using voice recognition software. Any grammatical or context distortions are unintentional and inherent to the software

## 2021-06-17 NOTE — PROGRESS NOTES
"Assessment and Plan:     Class 3 obesity with serious comorbidity and body mass index (BMI) of 45.0 to 49.9 in adult, unspecified obesity type  52 y.o. year old female in clinic today to discuss treatment of the following conditions through diet and lifestyle modification and weight loss:  1. Essential hypertension    2. Class 3 obesity with serious comorbidity and body mass index (BMI) of 45.0 to 49.9 in adult, unspecified obesity type    3. Metabolic syndrome    4. Acanthosis nigricans    5. PCOS (polycystic ovarian syndrome)      The patient's efforts this past month were successful as evidenced by weight loss.   - continue to plan and prep.   - continue metformin   - establish care in [place].      Follow up in 1 month.  Continue supplements.    Recommend increasing movement throughout the day--sitting less, moving more.  Will increase activity over time to reach a goal of at least 150 minutes of moderate exercise each week.  Behavior modification:  Cognitive restructuring exercises, journaling stressors, triggers for food cravings.  Dietary Intervention:  Reduced calorie, reduced carbohydrates, whole food diet.  Greater than 50% of this 15 minute visit was spent in counseling regarding patient's obesity, medications, dietary, exercise, and behavior modification recommendations.      Subjective  Patient presents for treatment of chronic, comorbid conditions using intensive therapeutic lifestyle interventions including nutrition, physical activity, and behavior management.    struggling with breakfast.  \"Shocked that she lost weight.\"  Her daughter was  this past weekend.    Lots of pasta.  New job and move to [place].     Are you experiencing any side effects to the medications:  NA  Hunger control:  good  Exercise was discussed: yes  Taking supplements:  yes  Discussed journaling food:  yes  Patient is pleased with the current results:  yes  The patient is following the nutrition plan:  yes  Barriers to " losing weight:  Behavior:  social calories    Patient Active Problem List   Diagnosis     Liposarcoma of upper extremity     HTN (hypertension)     Mild intermittent asthma     Class 3 obesity with serious comorbidity and body mass index (BMI) of 45.0 to 49.9 in adult, unspecified obesity type     Metabolic syndrome     PCOS (polycystic ovarian syndrome)     Prediabetes       Current Outpatient Prescriptions on File Prior to Visit   Medication Sig Dispense Refill     albuterol (PROAIR HFA) 90 mcg/actuation inhaler Inhale 2 puffs every 4 (four) hours as needed for wheezing. 1 Inhaler 1     ascorbic acid (ASCORBIC ACID WITH ZAHIRA HIPS) 500 MG tablet Take 500 mg by mouth daily.       chlorthalidone (HYGROTEN) 25 MG tablet TAKE 1 TABLET (25 MG TOTAL) BY MOUTH DAILY. 90 tablet 3     multivitamin capsule Take 1 capsule by mouth daily.       [DISCONTINUED] losartan (COZAAR) 25 MG tablet Take 1 tablet (25 mg total) by mouth daily. 90 tablet 1     [DISCONTINUED] metFORMIN (GLUCOPHAGE XR) 500 MG 24 hr tablet Take 2 tablets (1,000 mg total) by mouth daily with breakfast. 180 tablet 1     No current facility-administered medications on file prior to visit.        Objective:  Vitals:    04/25/18 1401   BP: 128/80   Pulse: 64     Initial Weight: 291 lbs  Weight: 267 lb (121.1 kg)  Weight loss from initial: 24  % Weight loss: 8.25 %  Body mass index is 44.43 kg/(m^2).  No LMP recorded. Patient has had a hysterectomy.  General:  Patient is alert, pleasant, no distress.  Patient is obese.    This note has been dictated using voice recognition software. Any grammatical or context distortions are unintentional and inherent to the software

## 2021-07-03 NOTE — ADDENDUM NOTE
Addendum Note by Ruth Marina MD at 11/27/2017  2:03 PM     Author: Ruth Marina MD Service: -- Author Type: Physician    Filed: 11/27/2017  2:03 PM Encounter Date: 11/27/2017 Status: Signed    : Ruth Marina MD (Physician)    Addended by: RUTH MARINA on: 11/27/2017 02:03 PM        Modules accepted: Orders